# Patient Record
Sex: FEMALE | Race: BLACK OR AFRICAN AMERICAN | NOT HISPANIC OR LATINO | Employment: OTHER | URBAN - METROPOLITAN AREA
[De-identification: names, ages, dates, MRNs, and addresses within clinical notes are randomized per-mention and may not be internally consistent; named-entity substitution may affect disease eponyms.]

---

## 2023-01-16 PROBLEM — I10 PRIMARY HYPERTENSION: Status: ACTIVE | Noted: 2023-01-16

## 2023-01-16 PROBLEM — J96.22 ACUTE ON CHRONIC RESPIRATORY FAILURE WITH HYPERCAPNIA: Status: ACTIVE | Noted: 2023-01-16

## 2023-01-16 PROBLEM — R07.89 OTHER CHEST PAIN: Status: ACTIVE | Noted: 2023-01-16

## 2023-01-16 PROBLEM — E66.01 SEVERE OBESITY (BMI >= 40): Status: ACTIVE | Noted: 2023-01-16

## 2023-01-16 PROBLEM — E11.49 TYPE 2 DIABETES MELLITUS WITH NEUROLOGIC COMPLICATION, WITHOUT LONG-TERM CURRENT USE OF INSULIN: Status: ACTIVE | Noted: 2023-01-16

## 2023-01-16 PROBLEM — R06.02 SHORTNESS OF BREATH: Status: ACTIVE | Noted: 2023-01-16

## 2023-01-16 PROBLEM — G93.41 ENCEPHALOPATHY, METABOLIC: Status: ACTIVE | Noted: 2023-01-16

## 2023-01-16 PROBLEM — R79.89 ELEVATED TROPONIN LEVEL NOT DUE MYOCARDIAL INFARCTION: Status: ACTIVE | Noted: 2023-01-16

## 2023-01-18 PROBLEM — S21.201A WOUND OF RIGHT SIDE OF BACK: Status: ACTIVE | Noted: 2023-01-18

## 2023-01-19 ENCOUNTER — PATIENT OUTREACH (OUTPATIENT)
Dept: ADMINISTRATIVE | Facility: CLINIC | Age: 73
End: 2023-01-19

## 2023-01-19 NOTE — PROGRESS NOTES
C3 nurse attempted to contact [facility] for a TCC post hospital discharge follow up call. No answer. The patient does not have a scheduled HOSFU appointment, and [facility]

## 2023-01-29 PROBLEM — I50.9 CONGESTIVE HEART FAILURE: Status: ACTIVE | Noted: 2023-01-29

## 2023-01-29 PROBLEM — R09.02 HYPOXIA: Status: ACTIVE | Noted: 2023-01-29

## 2023-04-24 PROBLEM — J96.22 ACUTE ON CHRONIC RESPIRATORY FAILURE WITH HYPERCAPNIA: Status: RESOLVED | Noted: 2023-01-16 | Resolved: 2023-04-24

## 2023-08-03 ENCOUNTER — PES CALL (OUTPATIENT)
Dept: ADMINISTRATIVE | Facility: CLINIC | Age: 73
End: 2023-08-03